# Patient Record
Sex: MALE | Race: WHITE | NOT HISPANIC OR LATINO | URBAN - METROPOLITAN AREA
[De-identification: names, ages, dates, MRNs, and addresses within clinical notes are randomized per-mention and may not be internally consistent; named-entity substitution may affect disease eponyms.]

---
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
## 2024-05-24 ENCOUNTER — HOSPITAL ENCOUNTER (EMERGENCY)
Facility: MEDICAL CENTER | Age: 24
End: 2024-05-24
Attending: EMERGENCY MEDICINE
Payer: COMMERCIAL

## 2024-05-24 ENCOUNTER — APPOINTMENT (OUTPATIENT)
Dept: RADIOLOGY | Facility: MEDICAL CENTER | Age: 24
End: 2024-05-24
Attending: EMERGENCY MEDICINE
Payer: COMMERCIAL

## 2024-05-24 VITALS
WEIGHT: 220 LBS | HEIGHT: 70 IN | RESPIRATION RATE: 16 BRPM | BODY MASS INDEX: 31.5 KG/M2 | DIASTOLIC BLOOD PRESSURE: 87 MMHG | TEMPERATURE: 98.5 F | OXYGEN SATURATION: 97 % | HEART RATE: 75 BPM | SYSTOLIC BLOOD PRESSURE: 147 MMHG

## 2024-05-24 DIAGNOSIS — R68.89 LIGHT SENSITIVITY: ICD-10-CM

## 2024-05-24 DIAGNOSIS — F07.81 POST CONCUSSION SYNDROME: ICD-10-CM

## 2024-05-24 LAB
ALBUMIN SERPL BCP-MCNC: 4.8 G/DL (ref 3.2–4.9)
ALBUMIN/GLOB SERPL: 1.5 G/DL
ALP SERPL-CCNC: 114 U/L (ref 30–99)
ALT SERPL-CCNC: 14 U/L (ref 2–50)
ANION GAP SERPL CALC-SCNC: 14 MMOL/L (ref 7–16)
AST SERPL-CCNC: 22 U/L (ref 12–45)
BASOPHILS # BLD AUTO: 0.3 % (ref 0–1.8)
BASOPHILS # BLD: 0.02 K/UL (ref 0–0.12)
BILIRUB SERPL-MCNC: 0.9 MG/DL (ref 0.1–1.5)
BUN SERPL-MCNC: 17 MG/DL (ref 8–22)
CALCIUM ALBUM COR SERPL-MCNC: 9.1 MG/DL (ref 8.5–10.5)
CALCIUM SERPL-MCNC: 9.7 MG/DL (ref 8.5–10.5)
CHLORIDE SERPL-SCNC: 101 MMOL/L (ref 96–112)
CO2 SERPL-SCNC: 22 MMOL/L (ref 20–33)
CREAT SERPL-MCNC: 0.87 MG/DL (ref 0.5–1.4)
CRP SERPL HS-MCNC: <0.3 MG/DL (ref 0–0.75)
EOSINOPHIL # BLD AUTO: 0.09 K/UL (ref 0–0.51)
EOSINOPHIL NFR BLD: 1.4 % (ref 0–6.9)
ERYTHROCYTE [DISTWIDTH] IN BLOOD BY AUTOMATED COUNT: 37.3 FL (ref 35.9–50)
ERYTHROCYTE [SEDIMENTATION RATE] IN BLOOD BY WESTERGREN METHOD: 2 MM/HOUR (ref 0–20)
GFR SERPLBLD CREATININE-BSD FMLA CKD-EPI: 125 ML/MIN/1.73 M 2
GLOBULIN SER CALC-MCNC: 3.2 G/DL (ref 1.9–3.5)
GLUCOSE SERPL-MCNC: 98 MG/DL (ref 65–99)
HCT VFR BLD AUTO: 54 % (ref 42–52)
HGB BLD-MCNC: 18.8 G/DL (ref 14–18)
IMM GRANULOCYTES # BLD AUTO: 0.01 K/UL (ref 0–0.11)
IMM GRANULOCYTES NFR BLD AUTO: 0.2 % (ref 0–0.9)
LYMPHOCYTES # BLD AUTO: 2.21 K/UL (ref 1–4.8)
LYMPHOCYTES NFR BLD: 33.2 % (ref 22–41)
MCH RBC QN AUTO: 30 PG (ref 27–33)
MCHC RBC AUTO-ENTMCNC: 34.8 G/DL (ref 32.3–36.5)
MCV RBC AUTO: 86.3 FL (ref 81.4–97.8)
MONOCYTES # BLD AUTO: 0.54 K/UL (ref 0–0.85)
MONOCYTES NFR BLD AUTO: 8.1 % (ref 0–13.4)
NEUTROPHILS # BLD AUTO: 3.79 K/UL (ref 1.82–7.42)
NEUTROPHILS NFR BLD: 56.8 % (ref 44–72)
NRBC # BLD AUTO: 0 K/UL
NRBC BLD-RTO: 0 /100 WBC (ref 0–0.2)
PLATELET # BLD AUTO: 245 K/UL (ref 164–446)
PMV BLD AUTO: 9.5 FL (ref 9–12.9)
POTASSIUM SERPL-SCNC: 4.2 MMOL/L (ref 3.6–5.5)
PROT SERPL-MCNC: 8 G/DL (ref 6–8.2)
RBC # BLD AUTO: 6.26 M/UL (ref 4.7–6.1)
SODIUM SERPL-SCNC: 137 MMOL/L (ref 135–145)
WBC # BLD AUTO: 6.7 K/UL (ref 4.8–10.8)

## 2024-05-24 RX ORDER — SODIUM CHLORIDE, SODIUM LACTATE, POTASSIUM CHLORIDE, CALCIUM CHLORIDE 600; 310; 30; 20 MG/100ML; MG/100ML; MG/100ML; MG/100ML
1000 INJECTION, SOLUTION INTRAVENOUS ONCE
Status: COMPLETED | OUTPATIENT
Start: 2024-05-24 | End: 2024-05-24

## 2024-05-24 RX ORDER — KETOROLAC TROMETHAMINE 15 MG/ML
15 INJECTION, SOLUTION INTRAMUSCULAR; INTRAVENOUS ONCE
Status: COMPLETED | OUTPATIENT
Start: 2024-05-24 | End: 2024-05-24

## 2024-05-24 RX ORDER — ONDANSETRON 2 MG/ML
4 INJECTION INTRAMUSCULAR; INTRAVENOUS ONCE
Status: COMPLETED | OUTPATIENT
Start: 2024-05-24 | End: 2024-05-24

## 2024-05-24 RX ADMIN — KETOROLAC TROMETHAMINE 15 MG: 15 INJECTION, SOLUTION INTRAMUSCULAR; INTRAVENOUS at 13:09

## 2024-05-24 RX ADMIN — SODIUM CHLORIDE, POTASSIUM CHLORIDE, SODIUM LACTATE AND CALCIUM CHLORIDE 1000 ML: 600; 310; 30; 20 INJECTION, SOLUTION INTRAVENOUS at 13:10

## 2024-05-24 RX ADMIN — ONDANSETRON 4 MG: 2 INJECTION INTRAMUSCULAR; INTRAVENOUS at 13:09

## 2024-05-24 ASSESSMENT — FIBROSIS 4 INDEX: FIB4 SCORE: 0.5

## 2024-05-24 NOTE — ED NOTES
The patient has been provided with discharge education and information.  The patient was also provided with instructions on follow up care and return precautions.  The patient verbalizes understanding of discharge instructions, follow up care, and return precautions.  All questions have been answered.  No RX prescribed by ERP.  NAD, A/Ox4, good color and appropriate at time of discharge.  Patient wheeled out of department with mother.

## 2024-05-24 NOTE — ED TRIAGE NOTES
"Chief Complaint   Patient presents with    Headache     Pt was at an event with loud music and has relapsing symptoses of his concussion. Reoccurrence approx every 4 months.   Confusion, body heaviness, pupil dilation ,photosensitivity, \"vibrating\"    This occurrence has been going on approx 3 weeks.     BP (!) 145/93   Pulse 75   Temp 36.9 °C (98.5 °F) (Temporal)   Resp 16   Ht 1.778 m (5' 10\")   Wt 99.8 kg (220 lb)   SpO2 98%   BMI 31.57 kg/m²       "

## 2024-05-24 NOTE — DISCHARGE INSTRUCTIONS
Book your appointment  [facility] conducts concussion baseline and post-injury screening for free or for a nominal cost. A full list of costs will be provided upon request for an evaluation.    This testing includes an assessment of balance, vision, and cognition. It takes approximately 60 minutes to complete; however, times may vary based on your individual needs. During your appointment, if you do not have a physician involved in your care, we are happy to provide a list and/or a referral to our numerous collaborating community partners.    Appointments can be requested by a physician, parent, or patient. To schedule an appointment, please click the link below and when prompted, enter the “[email or web address]” for the appointment password.    Once the form is filled out, you will receive a request for an appointment from our staff via email. If you have not received an email within 24 hours (unless it is a weekend), please call [phone]. All patients under the age of 18 must be accompanied by a legal parent or guardian.

## 2024-05-24 NOTE — ED PROVIDER NOTES
"ED Provider Note    CHIEF COMPLAINT  Chief Complaint   Patient presents with    Headache     Pt was at an event with loud music and has relapsing symptoses of his concussion. Reoccurrence approx every 4 months.   Confusion, body heaviness, pupil dilation ,photosensitivity, \"vibrating\"        EXTERNAL RECORDS REVIEWED  Reviewed previous imaging studies    HPI/ROS  LIMITATION TO HISTORY   None  OUTSIDE HISTORIAN(S):  Mother provided additional history    Shaka Fajardo is a 23 y.o. male who presents for evaluation of several months of waxing and waning reported headaches light sensitivity.  Patient is accompanied by his mother.  Apparently had a concussion around 18 months ago.  His workup was negative but he has episodes coming and going of light and sound sensitivity.  There is a personal history of migraine type headache.  He apparently had been seen by an optometrist but not an ophthalmologist yet.  Interestingly, they were prescribed red top dilating drops as the optometrist felt this may help but that was causing significant light sensitivity which would be expected.  They stopped using that around 2 weeks ago.  He does report photosensitivity and \"vibrating sensation.  He has no new or recent history of head injury.  No associated fevers or chills.  No numbness tingling or weakness    PAST MEDICAL HISTORY       SURGICAL HISTORY  patient denies any surgical history    FAMILY HISTORY  History reviewed. No pertinent family history.    SOCIAL HISTORY  Social History     Tobacco Use    Smoking status: Never    Smokeless tobacco: Never   Substance and Sexual Activity    Alcohol use: Never    Drug use: Never    Sexual activity: Not on file       CURRENT MEDICATIONS  Home Medications       Reviewed by Maya Fishman R.N. (Registered Nurse) on 05/24/24 at 1146  Med List Status: Not Addressed     Medication Last Dose Status        Patient Christian Taking any Medications                         Audit from " "Redirected Encounters    **Home medications have not yet been reviewed for this encounter**         ALLERGIES  Allergies   Allergen Reactions    Penicillins Shortness of Breath       PHYSICAL EXAM  VITAL SIGNS: BP (!) 147/87   Pulse 75   Temp 36.9 °C (98.5 °F) (Temporal)   Resp 16   Ht 1.778 m (5' 10\")   Wt 99.8 kg (220 lb)   SpO2 97%   BMI 31.57 kg/m²    Pulse ox interpretation: I interpret this pulse ox as normal.  Constitutional: Alert and oriented x 3, patient appears photophobic moderate distress  HEENT: Atraumatic normocephalic, pupils 3 mm are equal round reactive to light extraocular movements are intact. The nares is clear, external ears are normal, mouth shows moist mucous membranes normal dentition for age  Neck: Supple, no JVD no tracheal deviation no nuchal rigidity  Cardiovascular: Regular rate and rhythm no murmur rub or gallop 2+ pulses peripherally x4  Thorax & Lungs: No respiratory distress, no wheezes rales or rhonchi, No chest tenderness.   GI: Soft nontender nondistended positive bowel sounds, no peritoneal signs  Skin: Warm dry no acute rash or lesion  Musculoskeletal: Moving all extremities with full range and 5 of 5 strength no acute  deformity  Neurologic: Cranial nerves III through XII are grossly intact no sensory deficit no cerebellar dysfunction NIH stroke scale score of 0 no pronator drift of the arms or legs finger-nose testing is normal no ataxia  Psychiatric: Appropriate affect for situation at this time NIH stroke scale          EKG/LABS  Results for orders placed or performed during the hospital encounter of 05/24/24   CBC WITH DIFFERENTIAL   Result Value Ref Range    WBC 6.7 4.8 - 10.8 K/uL    RBC 6.26 (H) 4.70 - 6.10 M/uL    Hemoglobin 18.8 (H) 14.0 - 18.0 g/dL    Hematocrit 54.0 (H) 42.0 - 52.0 %    MCV 86.3 81.4 - 97.8 fL    MCH 30.0 27.0 - 33.0 pg    MCHC 34.8 32.3 - 36.5 g/dL    RDW 37.3 35.9 - 50.0 fL    Platelet Count 245 164 - 446 K/uL    MPV 9.5 9.0 - 12.9 fL    " Neutrophils-Polys 56.80 44.00 - 72.00 %    Lymphocytes 33.20 22.00 - 41.00 %    Monocytes 8.10 0.00 - 13.40 %    Eosinophils 1.40 0.00 - 6.90 %    Basophils 0.30 0.00 - 1.80 %    Immature Granulocytes 0.20 0.00 - 0.90 %    Nucleated RBC 0.00 0.00 - 0.20 /100 WBC    Neutrophils (Absolute) 3.79 1.82 - 7.42 K/uL    Lymphs (Absolute) 2.21 1.00 - 4.80 K/uL    Monos (Absolute) 0.54 0.00 - 0.85 K/uL    Eos (Absolute) 0.09 0.00 - 0.51 K/uL    Baso (Absolute) 0.02 0.00 - 0.12 K/uL    Immature Granulocytes (abs) 0.01 0.00 - 0.11 K/uL    NRBC (Absolute) 0.00 K/uL   Comp Metabolic Panel   Result Value Ref Range    Sodium 137 135 - 145 mmol/L    Potassium 4.2 3.6 - 5.5 mmol/L    Chloride 101 96 - 112 mmol/L    Co2 22 20 - 33 mmol/L    Anion Gap 14.0 7.0 - 16.0    Glucose 98 65 - 99 mg/dL    Bun 17 8 - 22 mg/dL    Creatinine 0.87 0.50 - 1.40 mg/dL    Calcium 9.7 8.5 - 10.5 mg/dL    Correct Calcium 9.1 8.5 - 10.5 mg/dL    AST(SGOT) 22 12 - 45 U/L    ALT(SGPT) 14 2 - 50 U/L    Alkaline Phosphatase 114 (H) 30 - 99 U/L    Total Bilirubin 0.9 0.1 - 1.5 mg/dL    Albumin 4.8 3.2 - 4.9 g/dL    Total Protein 8.0 6.0 - 8.2 g/dL    Globulin 3.2 1.9 - 3.5 g/dL    A-G Ratio 1.5 g/dL   CRP QUANTITIVE (NON-CARDIAC)   Result Value Ref Range    Stat C-Reactive Protein <0.30 0.00 - 0.75 mg/dL   Sed Rate   Result Value Ref Range    Sed Rate Westergren 2 0 - 20 mm/hour   ESTIMATED GFR   Result Value Ref Range    GFR (CKD-EPI) 125 >60 mL/min/1.73 m 2      I have independently interpreted this EKG    RADIOLOGY/PROCEDURES   I have independently interpreted the diagnostic imaging associated with this visit and am waiting the final reading from the radiologist.   My preliminary interpretation is as follows: No acute process    Radiologist interpretation:  CT-HEAD W/O   Final Result      1.  No intracranial abnormality detected.   2.  Acute on chronic right maxillary sinusitis.             COURSE & MEDICAL DECISION MAKING    ASSESSMENT, COURSE AND  PLAN  Care Narrative:     This is a very pleasant 23-year-old gentleman accompanied by his mom for waxing and waning symptoms including headache light and sound sensitivity and fatigue ever since he sustained what was described as a concussion around 18 months ago.  He already had initial brain imaging and referral with Dr. Guzmán with neurology.  Patient continues to have symptoms when he gets rundown.  Here he had a reassuring set of vital signs.  Specifically no high fever hypotension tachycardia or hypoxia.  He has a nonfocal neurological exam.  He has no neck stiffness or neurological deficit.  CBC metabolic panel and inflammatory markers such as sed rate and CRP are all reassuring and normal.  CT scan of the head did not demonstrate any acute process.  He was given IV fluids Toradol and Zofran and had somewhat improved symptoms.  Had a very lengthy discussion with the mother.  They already have an appointment with her neurologist in 3 days which will be next Tuesday.  I will provide new referral to Dr. Schmitt with ophthalmology as a I do feel he will require definitive ophthalmological consultation if symptoms continue.  Obviously the default diagnosis would be rather prolonged postconcussive syndrome for which that is a diagnosis of exclusion.  We will provide new referral to the [facility] neuro mechanics concussion lab as they are the regions definitive concussion experts.    Hydration: Based on the patient's presentation of Acute Vomiting the patient was given IV fluids. IV Hydration was used because oral hydration was not adequate alone. Upon recheck following hydration, the patient was improved.          ADDITIONAL PROBLEMS MANAGED      DISPOSITION AND DISCUSSIONS  I have discussed management of the patient with the following physicians and BOB's: None    Discussion of management with other QHP or appropriate source(s): None    Escalation of care considered, and ultimately not performed:  Considered MRI    Barriers to care at this time, including but not limited to: None.     Decision tools and prescription drugs considered including, but not limited to: NIH stroke scale score was utilized.    FINAL DIAGNOSIS  1. Post concussion syndrome    2. Light sensitivity           Electronically signed by: Nikolay March M.D., 5/24/2024 1:10 PM

## 2024-11-18 ENCOUNTER — APPOINTMENT (RX ONLY)
Dept: URBAN - METROPOLITAN AREA CLINIC 31 | Facility: CLINIC | Age: 24
Setting detail: DERMATOLOGY
End: 2024-11-18

## 2024-11-18 DIAGNOSIS — D18.0 HEMANGIOMA: ICD-10-CM

## 2024-11-18 DIAGNOSIS — Z71.89 OTHER SPECIFIED COUNSELING: ICD-10-CM

## 2024-11-18 DIAGNOSIS — L72.8 OTHER FOLLICULAR CYSTS OF THE SKIN AND SUBCUTANEOUS TISSUE: ICD-10-CM

## 2024-11-18 DIAGNOSIS — L81.4 OTHER MELANIN HYPERPIGMENTATION: ICD-10-CM

## 2024-11-18 DIAGNOSIS — D22 MELANOCYTIC NEVI: ICD-10-CM

## 2024-11-18 PROBLEM — D22.71 MELANOCYTIC NEVI OF RIGHT LOWER LIMB, INCLUDING HIP: Status: ACTIVE | Noted: 2024-11-18

## 2024-11-18 PROBLEM — D22.62 MELANOCYTIC NEVI OF LEFT UPPER LIMB, INCLUDING SHOULDER: Status: ACTIVE | Noted: 2024-11-18

## 2024-11-18 PROBLEM — D22.21 MELANOCYTIC NEVI OF RIGHT EAR AND EXTERNAL AURICULAR CANAL: Status: ACTIVE | Noted: 2024-11-18

## 2024-11-18 PROBLEM — D22.5 MELANOCYTIC NEVI OF TRUNK: Status: ACTIVE | Noted: 2024-11-18

## 2024-11-18 PROBLEM — D22.61 MELANOCYTIC NEVI OF RIGHT UPPER LIMB, INCLUDING SHOULDER: Status: ACTIVE | Noted: 2024-11-18

## 2024-11-18 PROBLEM — D18.01 HEMANGIOMA OF SKIN AND SUBCUTANEOUS TISSUE: Status: ACTIVE | Noted: 2024-11-18

## 2024-11-18 PROBLEM — D22.72 MELANOCYTIC NEVI OF LEFT LOWER LIMB, INCLUDING HIP: Status: ACTIVE | Noted: 2024-11-18

## 2024-11-18 PROCEDURE — ? COUNSELING

## 2024-11-18 PROCEDURE — 99203 OFFICE O/P NEW LOW 30 MIN: CPT

## 2024-11-18 ASSESSMENT — LOCATION SIMPLE DESCRIPTION DERM
LOCATION SIMPLE: RIGHT UPPER BACK
LOCATION SIMPLE: RIGHT UPPER ARM
LOCATION SIMPLE: SCALP
LOCATION SIMPLE: LEFT HAND
LOCATION SIMPLE: LEFT THIGH
LOCATION SIMPLE: LEFT SHOULDER
LOCATION SIMPLE: RIGHT LOWER BACK
LOCATION SIMPLE: RIGHT SHOULDER
LOCATION SIMPLE: LEFT UPPER ARM
LOCATION SIMPLE: RIGHT HAND
LOCATION SIMPLE: RIGHT EAR
LOCATION SIMPLE: RIGHT THIGH
LOCATION SIMPLE: RIGHT FOREARM
LOCATION SIMPLE: LEFT FOREARM
LOCATION SIMPLE: RIGHT CHEEK
LOCATION SIMPLE: ABDOMEN
LOCATION SIMPLE: RIGHT ANTERIOR NECK
LOCATION SIMPLE: LEFT CHEEK

## 2024-11-18 ASSESSMENT — LOCATION DETAILED DESCRIPTION DERM
LOCATION DETAILED: LEFT INFERIOR PARIETAL SCALP
LOCATION DETAILED: RIGHT ULNAR DORSAL HAND
LOCATION DETAILED: LEFT ANTERIOR DISTAL THIGH
LOCATION DETAILED: LEFT ANTERIOR DISTAL UPPER ARM
LOCATION DETAILED: RIGHT PROXIMAL POSTERIOR UPPER ARM
LOCATION DETAILED: LEFT RADIAL DORSAL HAND
LOCATION DETAILED: RIGHT SUPERIOR ANTERIOR NECK
LOCATION DETAILED: LEFT PROXIMAL DORSAL FOREARM
LOCATION DETAILED: RIGHT ANTERIOR DISTAL THIGH
LOCATION DETAILED: LEFT LATERAL MALAR CHEEK
LOCATION DETAILED: RIGHT CENTRAL MALAR CHEEK
LOCATION DETAILED: RIGHT ANTERIOR DISTAL UPPER ARM
LOCATION DETAILED: RIGHT INFERIOR MEDIAL UPPER BACK
LOCATION DETAILED: RIGHT SUPERIOR HELIX
LOCATION DETAILED: LEFT PROXIMAL POSTERIOR UPPER ARM
LOCATION DETAILED: RIGHT SUPERIOR MEDIAL MIDBACK
LOCATION DETAILED: RIGHT PROXIMAL DORSAL FOREARM
LOCATION DETAILED: EPIGASTRIC SKIN
LOCATION DETAILED: RIGHT POSTERIOR SHOULDER
LOCATION DETAILED: LEFT POSTERIOR SHOULDER

## 2024-11-18 ASSESSMENT — LOCATION ZONE DERM
LOCATION ZONE: LEG
LOCATION ZONE: NECK
LOCATION ZONE: ARM
LOCATION ZONE: SCALP
LOCATION ZONE: TRUNK
LOCATION ZONE: EAR
LOCATION ZONE: FACE
LOCATION ZONE: HAND

## 2025-01-04 ENCOUNTER — HOSPITAL ENCOUNTER (OUTPATIENT)
Facility: MEDICAL CENTER | Age: 25
End: 2025-01-05
Attending: EMERGENCY MEDICINE | Admitting: INTERNAL MEDICINE
Payer: COMMERCIAL

## 2025-01-04 ENCOUNTER — APPOINTMENT (OUTPATIENT)
Dept: RADIOLOGY | Facility: MEDICAL CENTER | Age: 25
End: 2025-01-04
Attending: EMERGENCY MEDICINE
Payer: COMMERCIAL

## 2025-01-04 DIAGNOSIS — R55 SYNCOPE AND COLLAPSE: ICD-10-CM

## 2025-01-04 PROBLEM — E87.8 ELECTROLYTE IMBALANCE: Status: ACTIVE | Noted: 2025-01-04

## 2025-01-04 PROBLEM — F07.81 POSTCONCUSSIVE SYNDROME: Status: ACTIVE | Noted: 2025-01-04

## 2025-01-04 PROBLEM — Z87.820 HISTORY OF CONCUSSION: Status: ACTIVE | Noted: 2025-01-04

## 2025-01-04 PROBLEM — J45.20 MILD INTERMITTENT ASTHMA WITHOUT COMPLICATION: Status: ACTIVE | Noted: 2025-01-04

## 2025-01-04 PROBLEM — Z87.820 HISTORY OF CONCUSSION: Status: RESOLVED | Noted: 2025-01-04 | Resolved: 2025-01-04

## 2025-01-04 LAB
ALBUMIN SERPL BCP-MCNC: 4.5 G/DL (ref 3.2–4.9)
ALBUMIN/GLOB SERPL: 1.7 G/DL
ALP SERPL-CCNC: 107 U/L (ref 30–99)
ALT SERPL-CCNC: 13 U/L (ref 2–50)
ANION GAP SERPL CALC-SCNC: 12 MMOL/L (ref 7–16)
APPEARANCE UR: CLEAR
AST SERPL-CCNC: 19 U/L (ref 12–45)
BASOPHILS # BLD AUTO: 0.7 % (ref 0–1.8)
BASOPHILS # BLD: 0.05 K/UL (ref 0–0.12)
BILIRUB SERPL-MCNC: 0.8 MG/DL (ref 0.1–1.5)
BILIRUB UR QL STRIP.AUTO: NEGATIVE
BUN SERPL-MCNC: 13 MG/DL (ref 8–22)
CALCIUM ALBUM COR SERPL-MCNC: 8.6 MG/DL (ref 8.5–10.5)
CALCIUM SERPL-MCNC: 9 MG/DL (ref 8.5–10.5)
CHLORIDE SERPL-SCNC: 104 MMOL/L (ref 96–112)
CO2 SERPL-SCNC: 23 MMOL/L (ref 20–33)
COLOR UR: YELLOW
CREAT SERPL-MCNC: 0.92 MG/DL (ref 0.5–1.4)
EOSINOPHIL # BLD AUTO: 0.04 K/UL (ref 0–0.51)
EOSINOPHIL NFR BLD: 0.5 % (ref 0–6.9)
ERYTHROCYTE [DISTWIDTH] IN BLOOD BY AUTOMATED COUNT: 35.6 FL (ref 35.9–50)
GFR SERPLBLD CREATININE-BSD FMLA CKD-EPI: 120 ML/MIN/1.73 M 2
GLOBULIN SER CALC-MCNC: 2.7 G/DL (ref 1.9–3.5)
GLUCOSE SERPL-MCNC: 95 MG/DL (ref 65–99)
GLUCOSE UR STRIP.AUTO-MCNC: NEGATIVE MG/DL
HCT VFR BLD AUTO: 45.6 % (ref 42–52)
HGB BLD-MCNC: 16.2 G/DL (ref 14–18)
IMM GRANULOCYTES # BLD AUTO: 0.02 K/UL (ref 0–0.11)
IMM GRANULOCYTES NFR BLD AUTO: 0.3 % (ref 0–0.9)
KETONES UR STRIP.AUTO-MCNC: 15 MG/DL
LACTATE SERPL-SCNC: 1 MMOL/L (ref 0.5–2)
LEUKOCYTE ESTERASE UR QL STRIP.AUTO: NEGATIVE
LIPASE SERPL-CCNC: 20 U/L (ref 11–82)
LYMPHOCYTES # BLD AUTO: 1.96 K/UL (ref 1–4.8)
LYMPHOCYTES NFR BLD: 25.8 % (ref 22–41)
MAGNESIUM SERPL-MCNC: 2.4 MG/DL (ref 1.5–2.5)
MCH RBC QN AUTO: 29.8 PG (ref 27–33)
MCHC RBC AUTO-ENTMCNC: 35.5 G/DL (ref 32.3–36.5)
MCV RBC AUTO: 83.8 FL (ref 81.4–97.8)
MICRO URNS: ABNORMAL
MONOCYTES # BLD AUTO: 0.45 K/UL (ref 0–0.85)
MONOCYTES NFR BLD AUTO: 5.9 % (ref 0–13.4)
NEUTROPHILS # BLD AUTO: 5.08 K/UL (ref 1.82–7.42)
NEUTROPHILS NFR BLD: 66.8 % (ref 44–72)
NITRITE UR QL STRIP.AUTO: NEGATIVE
NRBC # BLD AUTO: 0 K/UL
NRBC BLD-RTO: 0 /100 WBC (ref 0–0.2)
PH UR STRIP.AUTO: 6 [PH] (ref 5–8)
PLATELET # BLD AUTO: 258 K/UL (ref 164–446)
PMV BLD AUTO: 9.1 FL (ref 9–12.9)
POTASSIUM SERPL-SCNC: 3.9 MMOL/L (ref 3.6–5.5)
PROT SERPL-MCNC: 7.2 G/DL (ref 6–8.2)
PROT UR QL STRIP: NEGATIVE MG/DL
RBC # BLD AUTO: 5.44 M/UL (ref 4.7–6.1)
RBC UR QL AUTO: NEGATIVE
SODIUM SERPL-SCNC: 139 MMOL/L (ref 135–145)
SP GR UR STRIP.AUTO: 1.02
UROBILINOGEN UR STRIP.AUTO-MCNC: 0.2 EU/DL
WBC # BLD AUTO: 7.6 K/UL (ref 4.8–10.8)

## 2025-01-04 PROCEDURE — 85025 COMPLETE CBC W/AUTO DIFF WBC: CPT

## 2025-01-04 PROCEDURE — 71045 X-RAY EXAM CHEST 1 VIEW: CPT

## 2025-01-04 PROCEDURE — 99222 1ST HOSP IP/OBS MODERATE 55: CPT | Performed by: INTERNAL MEDICINE

## 2025-01-04 PROCEDURE — A9270 NON-COVERED ITEM OR SERVICE: HCPCS | Performed by: NURSE PRACTITIONER

## 2025-01-04 PROCEDURE — 80053 COMPREHEN METABOLIC PANEL: CPT

## 2025-01-04 PROCEDURE — G0378 HOSPITAL OBSERVATION PER HR: HCPCS

## 2025-01-04 PROCEDURE — 700102 HCHG RX REV CODE 250 W/ 637 OVERRIDE(OP): Performed by: NURSE PRACTITIONER

## 2025-01-04 PROCEDURE — 83690 ASSAY OF LIPASE: CPT

## 2025-01-04 PROCEDURE — 36415 COLL VENOUS BLD VENIPUNCTURE: CPT

## 2025-01-04 PROCEDURE — 83735 ASSAY OF MAGNESIUM: CPT

## 2025-01-04 PROCEDURE — 83605 ASSAY OF LACTIC ACID: CPT

## 2025-01-04 PROCEDURE — 81003 URINALYSIS AUTO W/O SCOPE: CPT

## 2025-01-04 PROCEDURE — 99285 EMERGENCY DEPT VISIT HI MDM: CPT

## 2025-01-04 RX ORDER — IBUPROFEN 600 MG/1
600 TABLET, FILM COATED ORAL EVERY 6 HOURS PRN
Status: DISCONTINUED | OUTPATIENT
Start: 2025-01-04 | End: 2025-01-06 | Stop reason: HOSPADM

## 2025-01-04 RX ORDER — ACETAMINOPHEN 500 MG
500 TABLET ORAL EVERY 6 HOURS PRN
COMMUNITY

## 2025-01-04 RX ORDER — FREMANEZUMAB-VFRM 225 MG/1.5ML
225 INJECTION SUBCUTANEOUS
COMMUNITY

## 2025-01-04 RX ORDER — MEMANTINE HYDROCHLORIDE 10 MG/1
10 TABLET ORAL DAILY
COMMUNITY
Start: 2024-10-04

## 2025-01-04 RX ORDER — ALBUTEROL SULFATE 90 UG/1
2 INHALANT RESPIRATORY (INHALATION)
Status: DISCONTINUED | OUTPATIENT
Start: 2025-01-04 | End: 2025-01-05

## 2025-01-04 RX ORDER — ACETAMINOPHEN 325 MG/1
650 TABLET ORAL EVERY 6 HOURS PRN
Status: DISCONTINUED | OUTPATIENT
Start: 2025-01-04 | End: 2025-01-06 | Stop reason: HOSPADM

## 2025-01-04 RX ORDER — GUANFACINE 1 MG/1
1 TABLET ORAL DAILY
COMMUNITY

## 2025-01-04 RX ORDER — IBUPROFEN 200 MG
600 TABLET ORAL EVERY 6 HOURS PRN
COMMUNITY

## 2025-01-04 RX ORDER — SULFAMETHOXAZOLE AND TRIMETHOPRIM 800; 160 MG/1; MG/1
1 TABLET ORAL 2 TIMES DAILY
Status: ON HOLD | COMMUNITY
End: 2025-01-05

## 2025-01-04 SDOH — ECONOMIC STABILITY: TRANSPORTATION INSECURITY
IN THE PAST 12 MONTHS, HAS THE LACK OF TRANSPORTATION KEPT YOU FROM MEDICAL APPOINTMENTS OR FROM GETTING MEDICATIONS?: NO

## 2025-01-04 SDOH — ECONOMIC STABILITY: TRANSPORTATION INSECURITY
IN THE PAST 12 MONTHS, HAS LACK OF RELIABLE TRANSPORTATION KEPT YOU FROM MEDICAL APPOINTMENTS, MEETINGS, WORK OR FROM GETTING THINGS NEEDED FOR DAILY LIVING?: NO

## 2025-01-04 ASSESSMENT — LIFESTYLE VARIABLES
ON A TYPICAL DAY WHEN YOU DRINK ALCOHOL HOW MANY DRINKS DO YOU HAVE: 0
EVER HAD A DRINK FIRST THING IN THE MORNING TO STEADY YOUR NERVES TO GET RID OF A HANGOVER: NO
TOTAL SCORE: 0
HOW MANY TIMES IN THE PAST YEAR HAVE YOU HAD 5 OR MORE DRINKS IN A DAY: 0
TOTAL SCORE: 0
SUBSTANCE_ABUSE: 0
HAVE PEOPLE ANNOYED YOU BY CRITICIZING YOUR DRINKING: NO
HAVE YOU EVER FELT YOU SHOULD CUT DOWN ON YOUR DRINKING: NO
TOTAL SCORE: 0
ALCOHOL_USE: NO
CONSUMPTION TOTAL: NEGATIVE
EVER FELT BAD OR GUILTY ABOUT YOUR DRINKING: NO
AVERAGE NUMBER OF DAYS PER WEEK YOU HAVE A DRINK CONTAINING ALCOHOL: 0

## 2025-01-04 ASSESSMENT — SOCIAL DETERMINANTS OF HEALTH (SDOH)
WITHIN THE PAST 12 MONTHS, YOU WORRIED THAT YOUR FOOD WOULD RUN OUT BEFORE YOU GOT THE MONEY TO BUY MORE: NEVER TRUE
WITHIN THE LAST YEAR, HAVE TO BEEN RAPED OR FORCED TO HAVE ANY KIND OF SEXUAL ACTIVITY BY YOUR PARTNER OR EX-PARTNER?: NO
WITHIN THE PAST 12 MONTHS, THE FOOD YOU BOUGHT JUST DIDN'T LAST AND YOU DIDN'T HAVE MONEY TO GET MORE: NEVER TRUE
WITHIN THE LAST YEAR, HAVE YOU BEEN AFRAID OF YOUR PARTNER OR EX-PARTNER?: NO
WITHIN THE LAST YEAR, HAVE YOU BEEN HUMILIATED OR EMOTIONALLY ABUSED IN OTHER WAYS BY YOUR PARTNER OR EX-PARTNER?: NO
WITHIN THE LAST YEAR, HAVE YOU BEEN KICKED, HIT, SLAPPED, OR OTHERWISE PHYSICALLY HURT BY YOUR PARTNER OR EX-PARTNER?: NO
IN THE PAST 12 MONTHS, HAS THE ELECTRIC, GAS, OIL, OR WATER COMPANY THREATENED TO SHUT OFF SERVICE IN YOUR HOME?: NO

## 2025-01-04 ASSESSMENT — COGNITIVE AND FUNCTIONAL STATUS - GENERAL
SUGGESTED CMS G CODE MODIFIER MOBILITY: CH
SUGGESTED CMS G CODE MODIFIER DAILY ACTIVITY: CH
DAILY ACTIVITIY SCORE: 24
MOBILITY SCORE: 24

## 2025-01-04 ASSESSMENT — ENCOUNTER SYMPTOMS
SINUS PAIN: 0
SHORTNESS OF BREATH: 0
WHEEZING: 0
CONSTIPATION: 0
FEVER: 0
DIAPHORESIS: 0
VOMITING: 0
FLANK PAIN: 0
MYALGIAS: 0
NAUSEA: 0
NERVOUS/ANXIOUS: 0
HEADACHES: 0
CHILLS: 0
SORE THROAT: 0
WEIGHT LOSS: 0
ABDOMINAL PAIN: 0
DIZZINESS: 0
DIARRHEA: 0
COUGH: 0
SEIZURES: 0
DEPRESSION: 0

## 2025-01-04 ASSESSMENT — FIBROSIS 4 INDEX
FIB4 SCORE: 0.45
FIB4 SCORE: 0.49

## 2025-01-04 ASSESSMENT — PAIN DESCRIPTION - PAIN TYPE
TYPE: ACUTE PAIN
TYPE: ACUTE PAIN

## 2025-01-04 ASSESSMENT — PATIENT HEALTH QUESTIONNAIRE - PHQ9
1. LITTLE INTEREST OR PLEASURE IN DOING THINGS: NOT AT ALL
2. FEELING DOWN, DEPRESSED, IRRITABLE, OR HOPELESS: NOT AT ALL
SUM OF ALL RESPONSES TO PHQ9 QUESTIONS 1 AND 2: 0

## 2025-01-04 NOTE — ED PROVIDER NOTES
ED Provider Note    CHIEF COMPLAINT  Chief Complaint   Patient presents with    Seizure     EMS transfer from [facility] for seizure <2 minutes witnessed by mom. Sent for EEG and neurology consult    Abnormal Labs     K 2.9, Ca 6.4, Mg 1.3       HPI  Shaka Fajardo is a 24 y.o. male who presents in transfer from [facility] out of concern for possible seizure event today.  Patient was on an exercycle when he started feeling very dizzy and lightheaded and ended up passing out on the floor and possibly having a seizure event as witnessed by his mother.  From the patient's mother's description, sounds like a tonic event but the patient did not lose bowel or bladder control, and did not bite his tongue.  He has no history of seizures but he has had multiple neurology workups for atypical migraines after a head injury 2 years ago.  He is on several different medications to help control the migraines.  EXTERNAL RECORDS REVIEWED  Reviewed intermittent neurology notes from the past 2 years.  Reviewed visit to [facility] emergency department today.  ROS  Constitutional: No fevers or chills  Skin: No rashes  HEENT: No sore throat, or runny nose  Neck: No neck pain  Chest: No pain or rashes  Pulm: No shortness of breath, cough, wheezing, stridor, or pain with inspiration/expiration  Gastrointestinal: No nausea, vomiting, diarrhea, or abdominal pain.  Genitourinary: No dysuria or hematuria  Musculoskeletal: No pain, swelling, or focal weakness  Neurologic: No current sensory or focal motor changes to extremities. No confusion or disorientation.  Heme: No bleeding or bruising problems.   Immuno: No hx of recurrent infections        LIMITATION TO HISTORY   None  OUTSIDE HISTORIAN(S):  None        PAST FAM HISTORY  History reviewed. No pertinent family history.    PAST MEDICAL HISTORY   has a past medical history of Asthma.    SOCIAL HISTORY  Social History     Tobacco Use    Smoking status: Never     "Smokeless tobacco: Never   Vaping Use    Vaping status: Never Used   Substance and Sexual Activity    Alcohol use: Never    Drug use: Never    Sexual activity: Not on file       SURGICAL HISTORY   has a past surgical history that includes other (12/10/2024).    CURRENT MEDICATIONS  Home Medications       Reviewed by Ciera Mcarthur R.N. (Registered Nurse) on 01/04/25 at 1612  Med List Status: Complete     Medication Last Dose Status   acetaminophen (TYLENOL) 500 MG Tab 12/20/2024 Active   Cyanocobalamin (VITAMIN B 12 PO) 11/20/2024 Active   Fremanezumab-vfrm (AJOVY) 225 MG/1.5ML Solution Prefilled Syringe 1/1/2025 Active   guanFACINE (TENEX) 1 MG Tab 1/3/2025 Active   ibuprofen (MOTRIN) 200 MG Tab 12/20/2024 Active   MAGNESIUM GLYCINATE PO 1/3/2025 Active   memantine (NAMENDA) 10 MG Tab 1/3/2025 Active   sulfamethoxazole-trimethoprim (BACTRIM DS) 800-160 MG tablet 12/29/2024 Active                  Audit from Redirected Encounters    **Home medications have not yet been reviewed for this encounter**          ALLERGIES  Allergies   Allergen Reactions    Penicillins Shortness of Breath    Keflex [Cephalexin] Cough       PHYSICAL EXAM  VITAL SIGNS: /79   Pulse 78   Temp 36.8 °C (98.2 °F) (Temporal)   Resp 16   Ht 1.778 m (5' 10\")   Wt 94.6 kg (208 lb 8.9 oz)   SpO2 96%   BMI 29.92 kg/m²    Gen: Alert in no apparent distress.  Smiling, conversant  HEENT: No signs of trauma, Bilateral external ears normal, Nose normal. Conjunctiva normal, Non-icteric.   Cardiovascular: Regular rate and rhythm, no murmurs.  Capillary refill less than 3 seconds to all extremities, 2+ distal pulses.  Thorax & Lungs: Normal breath sounds, No respiratory distress, No wheezing bilateral chest rise  Abdomen: Bowel sounds normal, Soft, No tenderness, No masses, No pulsatile masses. No Guarding or rebound  Skin: Warm, Dry, No erythema, No rash noted to exposed areas.   Extremities: Intact distal pulses, No edema  Neurologic: Alert , " "no facial droop, grossly normal coordination and strength  Psychiatric: Affect pleasant    INITIAL IMPRESSION  Patient arrives for evaluation and transfer from [facility] for possible neurology consultation and \"continuous EEG.\"  This was [place] neurologist contacted by phone from [facility] emergency department.  Based on the patient's symptoms and history, I feel is very unlikely to be related to a seizure episode and do not feel continuous EEG is going to be necessary.  Will plan on contacting the neurologist prior to admission.  I feel admission is necessary for evaluation of a possible cardiac cause of syncope.  Patient is noted to be on at least 2 different migraine medications, and has had neurology consultations both in [place], and at [place].  He has apparently had an EEG done in the past although it was not a continuous EEG.  He has had CT and MRIs.  Will plan on repeating the patient's blood work to ensure that the repletion of electrolytes is adequate, and contacting the hospitalist.    ED observation? No    LABS  Results for orders placed or performed during the hospital encounter of 01/04/25   CBC WITH DIFFERENTIAL    Collection Time: 01/04/25  2:05 PM   Result Value Ref Range    WBC 7.6 4.8 - 10.8 K/uL    RBC 5.44 4.70 - 6.10 M/uL    Hemoglobin 16.2 14.0 - 18.0 g/dL    Hematocrit 45.6 42.0 - 52.0 %    MCV 83.8 81.4 - 97.8 fL    MCH 29.8 27.0 - 33.0 pg    MCHC 35.5 32.3 - 36.5 g/dL    RDW 35.6 (L) 35.9 - 50.0 fL    Platelet Count 258 164 - 446 K/uL    MPV 9.1 9.0 - 12.9 fL    Neutrophils-Polys 66.80 44.00 - 72.00 %    Lymphocytes 25.80 22.00 - 41.00 %    Monocytes 5.90 0.00 - 13.40 %    Eosinophils 0.50 0.00 - 6.90 %    Basophils 0.70 0.00 - 1.80 %    Immature Granulocytes 0.30 0.00 - 0.90 %    Nucleated RBC 0.00 0.00 - 0.20 /100 WBC    Neutrophils (Absolute) 5.08 1.82 - 7.42 K/uL    Lymphs (Absolute) 1.96 1.00 - 4.80 K/uL    Monos (Absolute) 0.45 0.00 - " 0.85 K/uL    Eos (Absolute) 0.04 0.00 - 0.51 K/uL    Baso (Absolute) 0.05 0.00 - 0.12 K/uL    Immature Granulocytes (abs) 0.02 0.00 - 0.11 K/uL    NRBC (Absolute) 0.00 K/uL   COMP METABOLIC PANEL    Collection Time: 01/04/25  2:05 PM   Result Value Ref Range    Sodium 139 135 - 145 mmol/L    Potassium 3.9 3.6 - 5.5 mmol/L    Chloride 104 96 - 112 mmol/L    Co2 23 20 - 33 mmol/L    Anion Gap 12.0 7.0 - 16.0    Glucose 95 65 - 99 mg/dL    Bun 13 8 - 22 mg/dL    Creatinine 0.92 0.50 - 1.40 mg/dL    Calcium 9.0 8.5 - 10.5 mg/dL    Correct Calcium 8.6 8.5 - 10.5 mg/dL    AST(SGOT) 19 12 - 45 U/L    ALT(SGPT) 13 2 - 50 U/L    Alkaline Phosphatase 107 (H) 30 - 99 U/L    Total Bilirubin 0.8 0.1 - 1.5 mg/dL    Albumin 4.5 3.2 - 4.9 g/dL    Total Protein 7.2 6.0 - 8.2 g/dL    Globulin 2.7 1.9 - 3.5 g/dL    A-G Ratio 1.7 g/dL   LACTIC ACID    Collection Time: 01/04/25  2:05 PM   Result Value Ref Range    Lactic Acid 1.0 0.5 - 2.0 mmol/L   LIPASE    Collection Time: 01/04/25  2:05 PM   Result Value Ref Range    Lipase 20 11 - 82 U/L   MAGNESIUM    Collection Time: 01/04/25  2:05 PM   Result Value Ref Range    Magnesium 2.4 1.5 - 2.5 mg/dL   ESTIMATED GFR    Collection Time: 01/04/25  2:05 PM   Result Value Ref Range    GFR (CKD-EPI) 120 >60 mL/min/1.73 m 2   URINALYSIS (UA)    Collection Time: 01/04/25  2:40 PM    Specimen: Urine   Result Value Ref Range    Color Yellow     Character Clear     Specific Gravity 1.019 <1.035    Ph 6.0 5.0 - 8.0    Glucose Negative Negative mg/dL    Ketones 15 (A) Negative mg/dL    Protein Negative Negative mg/dL    Bilirubin Negative Negative    Urobilinogen, Urine 0.2 <=1.0 EU/dL    Nitrite Negative Negative    Leukocyte Esterase Negative Negative    Occult Blood Negative Negative    Micro Urine Req see below      I have independently interpreted this EKG  Reviewed EKG from Gurmeet Leiva  I have independently interpreted the diagnostic imaging associated with this visit and am waiting the final  reading from the radiologist.   My preliminary interpretation is a follows: 3 view chest x-ray: There are no convincing pulmonary opacities to suggest infiltrates or effusions.  Cardiomediastinal silhouette appears to be within normal limits  RADIOLOGY  DX-CHEST-PORTABLE (1 VIEW)   Final Result         No acute cardiac or pulmonary abnormality is identified.          ASSESSMENT, COURSE AND PLAN  Care Narrative: Case was discussed with the general neurologist on-call.  He agreed that the symptoms are far more likely to be related to a syncopal event than to a seizure event.  He did not recommend continuous EEG and did not feel emergent neurology consultation was necessary.  Case was discussed with the hospitalist who will evaluate the patient in the emergency department for observation and evaluation for cardiac sources of syncope.            I have discussed management of the patient with the following physicians and BOB's: Transferring physician at [facility], neurologist, hospitalist    Escalation of care considered, and ultimately not performed: Imaging, repeat EKG    Barriers to care at this time, including but not limited to: None     Decision tools and Rx drugs considered including, but not limited to : none.     Discussion of management with other QHP or appropriate source(s): None        FINAL IMPRESSION  1. Syncope and collapse        Electronically signed by: Win Sweeney M.D., 1/4/2025 2:08 PM

## 2025-01-04 NOTE — PROGRESS NOTES
Discussed with ER physician. Transfer from [facility] for continuous EEG monitoring. History is consistent with syncope; therefore hospital admission for syncope work-up is pending as per ERP. No event recurrence. Is at baseline and with a normal neurologic examination per report. I agree with ERP that continuous EEG monitoring is not indicated at this time. Please consult neurology if patient has recurrence of syncope or develops seizure-like events. Please reach out with any questions.

## 2025-01-04 NOTE — ED NOTES
Medication history reviewed with PT at bedside    Med rec is complete per PT reporting    Allergies reviewed.     PT was on Bactrim DS 10 day course started 12/19/2024. Last dose was 12/29/2024. Course was completed.    Patient is not taking anticoagulants.    Preferred pharmacy for this visit - [facility] on [address] ([phone])

## 2025-01-04 NOTE — H&P
Hospital Medicine History & Physical Note    Date of Service  1/4/2025    Primary Care Physician  Pcp Pt States None      Code Status  Full Code    Chief Complaint  Chief Complaint   Patient presents with    Seizure     EMS transfer from [facility] for seizure <2 minutes witnessed by mom. Sent for EEG and neurology consult    Abnormal Labs     K 2.9, Ca 6.4, Mg 1.3       History of Presenting Illness  Shaka Fajardo is a very pleasant 24 y.o. male with past medical history of asthma and postconcussive syndrome who transferred from [facility] to [facility] ED presented 1/4/2025 due to a syncopal event. Patient says he just started a workout program today for the new year. Today was the first day he used exercise bike. He says the workout was intense and after a couple minutes  he suddenly became lightheaded and short of breath. He got off the bike and sat down, feeling as though he was going to pass out. He says his heart rate was quite elevated and he could feel it slowing down and suddenly he woke up laying on the ground. He improved for short time, and they had another episode of syncope. Patient's mother was concerned that patient may have had a seizure. However, this is unclear. The patient presented to [facility] it was noted that his electrolytes were significantly low, including magnesium 1.3, potassium 2.9, calcium 6.4. His electrolytes were replaced prior to transport.  Labs obtained at presentation to [facility], Including CBC, CMP, lipase, lactic acid are unremarkable.  The patient denies recent cold or flulike symptoms, denies change in diet, G.I. or  symptoms. He says he felt healthy prior to this event. He feels back to baseline at this time is not had any further events    The patient denies having any episodes like this in the past. He did suffer concussion a couple years ago and has since had issues with focus, memory and, per family report, the patient has not been himself since  that time. He is had neurological workup, including workup at [facility]; however, he is not had any concussion rehab.  The patient does not smoke cigarettes, drink alcohol or use illicit drugs.    I discussed the plan of care with patient, family, and ERP .    Review of Systems  Review of Systems   Constitutional:  Negative for chills, diaphoresis, fever, malaise/fatigue and weight loss.   HENT:  Negative for congestion, sinus pain and sore throat.    Respiratory:  Negative for cough, shortness of breath and wheezing.    Gastrointestinal:  Negative for abdominal pain, constipation, diarrhea, nausea and vomiting.   Genitourinary:  Negative for dysuria, flank pain, frequency and urgency.   Musculoskeletal:  Negative for myalgias.   Skin:  Negative for itching and rash.   Neurological:  Negative for dizziness, seizures and headaches.   Psychiatric/Behavioral:  Negative for depression and substance abuse. The patient is not nervous/anxious.        Past Medical History   has a past medical history of Asthma. Concussion    Surgical History   has a past surgical history that includes other (12/10/2024).     Family History  family history is not on file.   Family history reviewed with patient. There is no family history that is pertinent to the chief complaint.     Social History   reports that he has never smoked. He has never used smokeless tobacco. He reports that he does not drink alcohol and does not use drugs.    Allergies  Allergies   Allergen Reactions    Penicillins Shortness of Breath    Keflex [Cephalexin] Cough       Medications  Prior to Admission Medications   Prescriptions Last Dose Informant Patient Reported? Taking?   guanFACINE (TENEX) 1 MG Tab 1/3/2025 Evening  Yes Yes   Sig: Take 1 mg by mouth every day.   memantine (NAMENDA) 10 MG Tab   Yes Yes   Sig: Take 10 mg by mouth every day.      Facility-Administered Medications: None       Physical Exam  Temp:  [36.7 °C (98 °F)-37.1 °C (98.7 °F)] 37.1 °C (98.7  °F)  Pulse:  [78-95] 88  Resp:  [13-20] 16  BP: (122-140)/(70-94) 136/78  SpO2:  [94 %-97 %] 94 %  Blood Pressure: (!) 130/94   Temperature: 36.7 °C (98 °F)   Pulse: 85   Respiration: 16   Pulse Oximetry: 96 %       Physical Exam  Vitals and nursing note reviewed.   Constitutional:       General: He is not in acute distress.     Appearance: Normal appearance. He is not ill-appearing, toxic-appearing or diaphoretic.   HENT:      Head: Normocephalic and atraumatic.      Nose: Nose normal. No congestion or rhinorrhea.      Mouth/Throat:      Mouth: Mucous membranes are dry.      Pharynx: Oropharynx is clear.   Eyes:      Extraocular Movements: Extraocular movements intact.      Pupils: Pupils are equal, round, and reactive to light.   Cardiovascular:      Rate and Rhythm: Normal rate and regular rhythm.      Pulses: Normal pulses.      Heart sounds: Normal heart sounds. No murmur heard.  Pulmonary:      Effort: Pulmonary effort is normal. No respiratory distress.   Abdominal:      General: Bowel sounds are normal. There is no distension.      Palpations: Abdomen is soft.      Tenderness: There is no abdominal tenderness.   Musculoskeletal:         General: Normal range of motion.      Cervical back: Normal range of motion.      Right lower leg: No edema.      Left lower leg: No edema.   Skin:     General: Skin is warm and dry.      Capillary Refill: Capillary refill takes less than 2 seconds.      Findings: No lesion or rash.   Neurological:      General: No focal deficit present.      Mental Status: He is alert and oriented to person, place, and time.      Cranial Nerves: No cranial nerve deficit.   Psychiatric:         Mood and Affect: Mood normal.         Behavior: Behavior normal.         Laboratory:  Recent Labs     01/04/25  1013 01/04/25  1405   WBC  --  7.6   RBC 5.49 5.44   HEMOGLOBIN 16.1 16.2   HEMATOCRIT 47.5 45.6   MCV 86.6 83.8   MCH 29.4 29.8   MCHC 33.9 35.5   RDW 12.5 35.6*   PLATELETCT 262 258   MPV  7.2 9.1     Recent Labs     25  1405   SODIUM 139   POTASSIUM 3.9   CHLORIDE 104   CO2 23   GLUCOSE 95   BUN 13   CREATININE 0.92   CALCIUM 9.0     Recent Labs     25  1405   ALTSGPT 13   ASTSGOT 19   ALKPHOSPHAT 107*   TBILIRUBIN 0.8   LIPASE 20   GLUCOSE 95       Imaging:  DX-CHEST-PORTABLE (1 VIEW)   Final Result         No acute cardiac or pulmonary abnormality is identified.      EC-ECHOCARDIOGRAM COMPLETE W/ CONT    (Results Pending)           ASSESSMENT/PLAN:  * Syncope and collapse- (present on admission)  Assessment & Plan  Concern concerning for syncope and collapse at the initiation of working out patient has had family members. Patient has had family members who  very early of MI.No stress test, as patient does not have chest pain, and shortness of breath may have been related to onset of exercise  Patient did report possible arrhythmia just prior to passing out. If episode recurs, may consider reaching out to cardiology for Holter monitor  Initially presented with concern for seizure. However, symptoms are consistent with syncope. This was discussed with neurology between [facility] and neurology.  - telemetry   - echocardiogram  - orthostatic blood pressures  - TSH, Vit B12  - consider brain MRI      Postconcussive syndrome- (present on admission)  Assessment & Plan  History of concussion with ongoing symptoms of forgetfulness, fogginess. Patient's mother reports patient has not been the same since. Has had workup for this, but no postconcussive therapy.   Patient should follow-up with his neurologist in [place] for further workup, as well as postconcussive therapy. Patient's father reported neurology at [place] suggested follow-up, which she will share happens  - suggest pursuing rehab for this, including possible alternative therapy     Electrolyte imbalance- (present on admission)  Assessment & Plan  Noted to have magnesium 1.3, potassium 2.9, calcium 6.4 , provided electrolyte  replacement prior to transport. Labs unremarkable at presentation  Patient denies change in diet.  - Repeat labs in the morning    Mild intermittent asthma without complication- (present on admission)  Assessment & Plan  Stable  -albuterol PRN      Justification for Admission Status  I anticipate this patient is appropriate for observation status at this time because patient requires further workup for syncope and collapse, including echocardiogram and telemetry monitoring    Patient will need a Telemetry bed on MEDICAL service  The need is secondary to monitoring for arrhythmia as contributing to episode of syncope.    VTE prophylaxis: SCDs/TEDs

## 2025-01-04 NOTE — ED NOTES
Phone report to T215 MARIAH Ballesteros. Patient to be transported to T215 on [employer] monitor with chart and all belongings. Mother accompanying patient.

## 2025-01-04 NOTE — ED TRIAGE NOTES
Shaka Fajardo  24 y.o. male  Chief Complaint   Patient presents with    Seizure     EMS transfer from [facility] for seizure <2 minutes witnessed by mom. Sent for EEG and neurology consult    Abnormal Labs     K 2.9, Ca 6.4, Mg 1.3     Pt BIB EMS for above complaint.    Pt is GCS 15, speaking in full sentences, follows commands and responds appropriately to questions. Resp are even and unlabored. Patient denies pain.       Vitals:    01/04/25 1328   BP: 138/88   Pulse: 80   Resp: 18   Temp: 36.7 °C (98 °F)   SpO2: 96%

## 2025-01-05 ENCOUNTER — APPOINTMENT (OUTPATIENT)
Dept: CARDIOLOGY | Facility: MEDICAL CENTER | Age: 25
End: 2025-01-05
Attending: NURSE PRACTITIONER
Payer: COMMERCIAL

## 2025-01-05 ENCOUNTER — APPOINTMENT (OUTPATIENT)
Dept: RADIOLOGY | Facility: MEDICAL CENTER | Age: 25
End: 2025-01-05
Attending: NURSE PRACTITIONER
Payer: COMMERCIAL

## 2025-01-05 VITALS
RESPIRATION RATE: 18 BRPM | DIASTOLIC BLOOD PRESSURE: 80 MMHG | SYSTOLIC BLOOD PRESSURE: 134 MMHG | HEIGHT: 70 IN | BODY MASS INDEX: 29.86 KG/M2 | HEART RATE: 76 BPM | OXYGEN SATURATION: 95 % | WEIGHT: 208.56 LBS | TEMPERATURE: 98.1 F

## 2025-01-05 PROBLEM — E87.8 ELECTROLYTE IMBALANCE: Status: RESOLVED | Noted: 2025-01-04 | Resolved: 2025-01-05

## 2025-01-05 PROBLEM — R55 SYNCOPE AND COLLAPSE: Status: RESOLVED | Noted: 2025-01-04 | Resolved: 2025-01-05

## 2025-01-05 LAB
25(OH)D3 SERPL-MCNC: 15 NG/ML (ref 30–100)
LV EJECT FRACT  99904: 63
TSH SERPL-ACNC: 2.75 UIU/ML (ref 0.35–5.5)
VIT B12 SERPL-MCNC: 600 PG/ML (ref 211–911)

## 2025-01-05 PROCEDURE — 84443 ASSAY THYROID STIM HORMONE: CPT

## 2025-01-05 PROCEDURE — G0378 HOSPITAL OBSERVATION PER HR: HCPCS

## 2025-01-05 PROCEDURE — 99239 HOSP IP/OBS DSCHRG MGMT >30: CPT | Performed by: INTERNAL MEDICINE

## 2025-01-05 PROCEDURE — A9270 NON-COVERED ITEM OR SERVICE: HCPCS | Performed by: INTERNAL MEDICINE

## 2025-01-05 PROCEDURE — 700117 HCHG RX CONTRAST REV CODE 255: Mod: JZ | Performed by: NURSE PRACTITIONER

## 2025-01-05 PROCEDURE — 82607 VITAMIN B-12: CPT

## 2025-01-05 PROCEDURE — A9579 GAD-BASE MR CONTRAST NOS,1ML: HCPCS | Mod: JZ | Performed by: NURSE PRACTITIONER

## 2025-01-05 PROCEDURE — 95819 EEG AWAKE AND ASLEEP: CPT | Mod: 26 | Performed by: PSYCHIATRY & NEUROLOGY

## 2025-01-05 PROCEDURE — 70553 MRI BRAIN STEM W/O & W/DYE: CPT

## 2025-01-05 PROCEDURE — 82306 VITAMIN D 25 HYDROXY: CPT

## 2025-01-05 PROCEDURE — 700102 HCHG RX REV CODE 250 W/ 637 OVERRIDE(OP): Performed by: INTERNAL MEDICINE

## 2025-01-05 PROCEDURE — 95819 EEG AWAKE AND ASLEEP: CPT | Performed by: PSYCHIATRY & NEUROLOGY

## 2025-01-05 PROCEDURE — 93306 TTE W/DOPPLER COMPLETE: CPT | Mod: 26 | Performed by: INTERNAL MEDICINE

## 2025-01-05 PROCEDURE — 93306 TTE W/DOPPLER COMPLETE: CPT

## 2025-01-05 RX ORDER — ALBUTEROL SULFATE 90 UG/1
2 INHALANT RESPIRATORY (INHALATION)
Status: DISCONTINUED | OUTPATIENT
Start: 2025-01-05 | End: 2025-01-06 | Stop reason: HOSPADM

## 2025-01-05 RX ORDER — VITAMIN B COMPLEX
1000 TABLET ORAL DAILY
Status: DISCONTINUED | OUTPATIENT
Start: 2025-01-05 | End: 2025-01-06 | Stop reason: HOSPADM

## 2025-01-05 RX ADMIN — GADOTERIDOL 19 ML: 279.3 INJECTION, SOLUTION INTRAVENOUS at 21:28

## 2025-01-05 RX ADMIN — Medication 1000 UNITS: at 15:44

## 2025-01-05 ASSESSMENT — PAIN DESCRIPTION - PAIN TYPE: TYPE: ACUTE PAIN

## 2025-01-05 NOTE — DISCHARGE SUMMARY
Discharge Summary    CHIEF COMPLAINT ON ADMISSION  Chief Complaint   Patient presents with    Seizure     EMS transfer from [facility] for seizure <2 minutes witnessed by mom. Sent for EEG and neurology consult    Abnormal Labs     K 2.9, Ca 6.4, Mg 1.3       Reason for Admission  EMS     Admission Date  1/4/2025    CODE STATUS  Full Code    HPI & HOSPITAL COURSE    Mr. Shaka Fajardo is a very pleasant 24 y.o. male with past medical history of asthma and postconcussive syndrome who transferred from [facility] to [facility] ED presented 1/4/2025 due to a syncopal event.     Patient says he just started a workout program today for the new year. Today was the first day he used exercise bike. He says the workout was intense and after a couple minutes  he suddenly became lightheaded and short of breath. He got off the bike and sat down, feeling as though he was going to pass out. He says his heart rate was quite elevated and he could feel it slowing down and suddenly he woke up laying on the ground. He improved for short time, and they had another episode of syncope. Patient's mother was concerned that patient may have had a seizure. However, this is unclear. The patient presented to [facility] it was noted that his electrolytes were significantly low, including magnesium 1.3, potassium 2.9, calcium 6.4. His electrolytes were replaced prior to transport.  Labs obtained at presentation to [facility], Including CBC, CMP, lipase, lactic acid are unremarkable. The patient denies recent cold or flulike symptoms, denies change in diet, G.I. or  symptoms. He says he felt healthy prior to this event. He feels back to baseline at this time is not had any further events     The patient denies having any episodes like this in the past. He did suffer concussion a couple years ago and has since had issues with focus, memory and, per family report, the patient has not been himself since that time. He is had  neurological workup, including workup at [place]; however, he is not had any concussion rehab.The patient does not smoke cigarettes, drink alcohol or use illicit drugs.  Patient admitted to hospital medicine for management of care.    During this hospitalization, an echocardiogram was obtained which noted normal left ventricular systolic function with LVEF approximately 63%, normal RV size and systolic function with patent foramen ovale by agitated saline injection.  A spot EEG was obtained which also noted no seizures, presence of rare, poorly formed, right temporal, sharply contoured waves volumes is of unclear clinical significance but might point towards increase propensity towards focal seizures arising from that specific region.  An MRI of the brain was also obtained which noted MRI brain with and without contrast within normal limits with hypoplastic right maxillary sinus with mucosal inflammatory changes in the right maxillary and ethmoid sinus and right nasofrontal recess with no evidence of mesial temporal sclerosis, cortical dysplasia, or heterotropic gray matter.    Patient seen and examined prior to being discharged.  Discussed results with patient along with mom who was at bedside.  At this time, no acute pathologies noted.  Per neurology, history is consistent with syncope therefore workup was done as patient is back to his baseline with normal neurologic examination per report.  Patient to continue following with [place] neurology for management of care and continue all home medications.  All questions and concerns answered by doing discharge.  Patient discharged home.    Therefore, he is discharged in good and stable condition to home with close outpatient follow-up.    The patient recovered much more quickly than anticipated on admission.    Discharge Date  01/05/25      FOLLOW UP ITEMS POST DISCHARGE  Please call [phone] to schedule PCP appointment for patient.    Required specialty  appointments include:       Discharge Instructions per CODIE Vasquez    Follow-up with PCP s/p hospitalization  Continue following up with [place] neurology for management of postconcussion syndrome  Continue all home medications    DIET: As tolerated    ACTIVITY: As tolerated    DIAGNOSIS: Syncope    Return to ER if symptoms persist, chest pain, palpitations, shortness of breath, numbness, tingling, weakness, and high fevers.      DISCHARGE DIAGNOSES  Principal Problem (Resolved):    Syncope and collapse (POA: Yes)  Active Problems:    Postconcussive syndrome (POA: Yes)    Mild intermittent asthma without complication (POA: Yes)  Resolved Problems:    Electrolyte imbalance (POA: Yes)    History of concussion (POA: Yes)      FOLLOW UP  No future appointments.  No follow-up provider specified.    MEDICATIONS ON DISCHARGE     Medication List        CONTINUE taking these medications        Instructions   acetaminophen 500 MG Tabs  Commonly known as: Tylenol   Take 500 mg by mouth every 6 hours as needed for Moderate Pain.  Dose: 500 mg     Ajovy 225 MG/1.5ML Sosy  Generic drug: Fremanezumab-vfrm   Inject 225 mg under the skin every 30 (thirty) days.  Dose: 225 mg     guanFACINE 1 MG Tabs  Commonly known as: Tenex   Take 1 mg by mouth every day.  Dose: 1 mg     ibuprofen 200 MG Tabs  Commonly known as: Motrin   Take 600 mg by mouth every 6 hours as needed for Mild Pain.  Dose: 600 mg     MAGNESIUM GLYCINATE PO   Take 1 Tablet by mouth every day.  Dose: 1 Tablet     memantine 10 MG Tabs  Commonly known as: Namenda   Take 10 mg by mouth every day.  Dose: 10 mg     VITAMIN B 12 PO   Take 400 mg by mouth every day.  Dose: 400 mg            STOP taking these medications      sulfamethoxazole-trimethoprim 800-160 MG tablet  Commonly known as: Bactrim DS              Allergies  Allergies   Allergen Reactions    Penicillins Shortness of Breath    Keflex [Cephalexin] Cough       DIET  Orders Placed This  Encounter   Procedures    Diet Order Diet: Regular     Standing Status:   Standing     Number of Occurrences:   1     Order Specific Question:   Diet:     Answer:   Regular [1]       ACTIVITY  As tolerated.  Weight bearing as tolerated    CONSULTATIONS  NONE    PROCEDURES  NONE    IMAGING  EC-ECHOCARDIOGRAM COMPLETE W/O CONT   Final Result      DX-CHEST-PORTABLE (1 VIEW)   Final Result         No acute cardiac or pulmonary abnormality is identified.      MR-BRAIN-WITH & W/O    (Results Pending)         LABORATORY  Lab Results   Component Value Date    SODIUM 139 01/04/2025    POTASSIUM 3.9 01/04/2025    CHLORIDE 104 01/04/2025    CO2 23 01/04/2025    GLUCOSE 95 01/04/2025    BUN 13 01/04/2025    CREATININE 0.92 01/04/2025    GLOMRATE 98 11/13/2022        Lab Results   Component Value Date    WBC 7.6 01/04/2025    HEMOGLOBIN 16.2 01/04/2025    HEMOGLOBIN 16.1 01/04/2025    HEMATOCRIT 45.6 01/04/2025    HEMATOCRIT 47.5 01/04/2025    PLATELETCT 258 01/04/2025    PLATELETCT 262 01/04/2025

## 2025-01-05 NOTE — PROCEDURES
[facility]    Inpatient Standard Video Electroencephalogram Report      Patient Name: Shaka Fajardo  MRN: [id number]  #: [id number]  Date of Service: 01/05/25  Total Recording Time: 0 hours and 24 minutes.  Referring Provider: Thuy Segovia*    INDICATION:  Shaka Fajardo 24 y.o. male. This standard, inpatient, EEG was requested to evaluate for seizure(s).    CURRENT ANTI-SEIZURE AND OTHER PERTINENT MEDICATIONS:     Current Facility-Administered Medications:     albuterol    acetaminophen    ibuprofen    TECHNIQUE: Standard inpatient video EEG was set up by a Neurodiagnostic technologist who performed education to the patient and staff. A minimum of 23 electrodes and 23 channel recording was setup and performed by Neurodiagnostic technologist, in accordance with the international 10-20 system. Impedence, electrode integrity, and technical impressions were documented. The study was reviewed in bipolar and referential montages. The recording examined the patient in the awake, drowsy, and sleep state(s).     DESCRIPTION OF THE RECORD:  EEG background: During maximal wakefulness, the background was continuous, symmetrical, and 11.5 Hz posterior dominant rhythm.  Reactivity and state changes were present.  During drowsiness, a loss of myogenic artifact and theta/delta frequencies were seen.     Occasional N2 sleep transients in the form of rudimentary and/or ill-defined sleep spindles fragments and vertex waves were seen in the leads over the central regions.     ACTIVATION PROCEDURES:   Intermittent Photic stimulation was performed in a stepwise fashion from 1 to 30 Hz and did not elicited any abnormalities on EEG.     ICTAL AND INTERICTAL FINDINGS:   There were rare, poorly formed, right temporal, sharply contoured waveforms noted.             No definite persistent regional slowing was seen during this routine study.      No electroclinical or electrographic seizures were reported or  recorded during the study.     EKG: Sampling of the EKG recording did not demonstrate any abnormalities    EVENTS:  No clinical events recorded or reported    INTERPRETATION:  This was likely an abnormal video EEG recording in the awake, drowsy, and sleep state(s):  The presence of rare, poorly formed, right temporal, sharply contoured waveforms, is of unclear clinical significance, but might point toward increased propensity toward focal seizures arising from that region.   No seizures.     Julian Carson MD  Neurology Attending, Epilepsy Program  [facility]

## 2025-01-05 NOTE — ASSESSMENT & PLAN NOTE
Noted to have magnesium 1.3, potassium 2.9, calcium 6.4 , provided electrolyte replacement prior to transport. Labs unremarkable at presentation  Patient denies change in diet.  - Repeat labs in the morning

## 2025-01-05 NOTE — PROGRESS NOTES
Report received, pt care assumed. Pt resting in bed. Pt mother at bedside. Pt instructed on use of call light. Call light and personal belongings within reach of pt. Seizure precautions in place.

## 2025-01-05 NOTE — ASSESSMENT & PLAN NOTE
Concern concerning for syncope and collapse at the initiation of working out patient has had family members. Patient has had family members who  very early of MI.No stress test, as patient does not have chest pain, and shortness of breath may have been related to onset of exercise  Patient did report possible arrhythmia just prior to passing out. If episode recurs, may consider reaching out to cardiology for Holter monitor  Initially presented with concern for seizure. However, symptoms are consistent with syncope. This was discussed with neurology between ERP and neurology.  - telemetry   - echocardiogram  - orthostatic blood pressures  - TSH, Vit B12  - consider brain MRI

## 2025-01-05 NOTE — CARE PLAN
The patient is Stable - Low risk of patient condition declining or worsening    Shift Goals  Clinical Goals: ECHO in AM, Pt safety, Orthos  Patient Goals: rest, answers  Family Goals: rest, answer    Progress made toward(s) clinical / shift goals:    Problem: Knowledge Deficit - Standard  Goal: Patient and family/care givers will demonstrate understanding of plan of care, disease process/condition, diagnostic tests and medications  Description: Target End Date:  1/5/25    1.  Patient and family/caregiver oriented to unit, equipment, visitation policy and means for communicating concern  2.  Complete/review Learning Assessment  3.  Assess knowledge level of disease process/condition, treatment plan, diagnostic tests and medications  4.  Explain disease process/condition, treatment plan, diagnostic tests and medications  Outcome: Progressing     Problem: Seizure Precautions  Goal: Implementation of seizure precautions  Description: Target End Date:  1/5/25    1.  Padded side rails up at all times  2.  Suction equipment and oxygen delivery system at bedside  3.  Continuous pulse oximeter in use  4.  Implement fall precautions, bed alarm on, bed in lowest position  5.  IV access (per order)  6.  Provide low stimulus environment, avoid exposure to triggers  7.  Instruct patient to use call light/seizure button if having warning signs of impending seizure  Outcome: Progressing

## 2025-01-05 NOTE — PROGRESS NOTES
4 Eyes Skin Assessment Completed by Ciera, RN and MARIAH Washburn.    Head WDL  Ears Redness and Blanching  Nose WDL  Mouth WDL  Neck WDL  Breast/Chest WDL  Shoulder Blades WDL  Spine WDL  (R) Arm/Elbow/Hand WDL  (L) Arm/Elbow/Hand WDL  Abdomen WDL  Groin WDL  Scrotum/Coccyx/Buttocks WDL  (R) Leg WDL  (L) Leg WDL  (R) Heel/Foot/Toe WDL  (L) Heel/Foot/Toe WDL          Devices In Places Pulse Ox      Interventions In Place N/A    Possible Skin Injury No    Pictures Uploaded Into Epic N/A  Wound Consult Placed N/A  RN Wound Prevention Protocol Ordered No

## 2025-01-05 NOTE — HOSPITAL COURSE
Mr. Shaka Fajardo is a very pleasant 24 y.o. male with past medical history of asthma and postconcussive syndrome who transferred from [facility] to [facility] ED presented 1/4/2025 due to a syncopal event.     Patient says he just started a workout program today for the new year. Today was the first day he used exercise bike. He says the workout was intense and after a couple minutes  he suddenly became lightheaded and short of breath. He got off the bike and sat down, feeling as though he was going to pass out. He says his heart rate was quite elevated and he could feel it slowing down and suddenly he woke up laying on the ground. He improved for short time, and they had another episode of syncope. Patient's mother was concerned that patient may have had a seizure. However, this is unclear. The patient presented to [facility] it was noted that his electrolytes were significantly low, including magnesium 1.3, potassium 2.9, calcium 6.4. His electrolytes were replaced prior to transport.  Labs obtained at presentation to [facility], Including CBC, CMP, lipase, lactic acid are unremarkable. The patient denies recent cold or flulike symptoms, denies change in diet, G.I. or  symptoms. He says he felt healthy prior to this event. He feels back to baseline at this time is not had any further events     The patient denies having any episodes like this in the past. He did suffer concussion a couple years ago and has since had issues with focus, memory and, per family report, the patient has not been himself since that time. He is had neurological workup, including workup at [place]; however, he is not had any concussion rehab.The patient does not smoke cigarettes, drink alcohol or use illicit drugs.  Patient admitted to hospital medicine for management of care.    During this hospitalization, an echocardiogram was obtained which noted normal left ventricular systolic function with LVEF approximately  63%, normal RV size and systolic function with patent foramen ovale by agitated saline injection.  A spot EEG was obtained which also noted no seizures, presence of rare, poorly formed, right temporal, sharply contoured waves volumes is of unclear clinical significance but might point towards increase propensity towards focal seizures arising from that specific region.  An MRI of the brain was also obtained which noted MRI brain with and without contrast within normal limits with hypoplastic right maxillary sinus with mucosal inflammatory changes in the right maxillary and ethmoid sinus and right nasofrontal recess with no evidence of mesial temporal sclerosis, cortical dysplasia, or heterotropic gray matter.    Patient seen and examined prior to being discharged.  Discussed results with patient along with mom who was at bedside.  At this time, no acute pathologies noted.  Per neurology, history is consistent with syncope therefore workup was done as patient is back to his baseline with normal neurologic examination per report.  Patient to continue following with [facility] neurology for management of care and continue all home medications.  All questions and concerns answered by doing discharge.  Patient discharged home.

## 2025-01-05 NOTE — PROGRESS NOTES
Report received from MARIAH Vang.  Assumed care of patient.  Agree with previous RN's assessment. Plan of care gone over with the patient and all concerns addressed.  Patient sitting up in bed with no complaints of pain.  Patient A & O  x 4.  No apparent signs of distress.  Safety precautions in place.  Patient educated to call for assistance.  Hourly rounding in place.

## 2025-01-05 NOTE — DISCHARGE INSTRUCTIONS
FOLLOW UP ITEMS POST DISCHARGE  Please call [phone] to schedule PCP appointment for patient.    Required specialty appointments include:       Discharge Instructions per FLAQUITA VasquezPRogelioRDEZ.    Follow-up with PCP s/p hospitalization  Continue following up with [facility] neurology for management of postconcussion syndrome  Continue all home medications    DIET: As tolerated    ACTIVITY: As tolerated    DIAGNOSIS: Syncope    Return to ER if symptoms persist, chest pain, palpitations, shortness of breath, numbness, tingling, weakness, and high fevers.

## 2025-01-05 NOTE — ASSESSMENT & PLAN NOTE
History of concussion with ongoing symptoms of forgetfulness, fogginess. Patient's mother reports patient has not been the same since. Has had workup for this, but no postconcussive therapy.   Patient should follow-up with his neurologist in [place] for further workup, as well as postconcussive therapy. Patient's father reported neurology at [place] suggested follow-up, which she will share happens  - suggest pursuing rehab for this, including possible alternative therapy    [employer] Counseling:  I discussed with the patient the risks of mycophenolate mofetil including but not limited to infection/immunosuppression, GI upset, hypokalemia, hypercholesterolemia, bone marrow suppression, lymphoproliferative disorders, malignancy, GI ulceration/bleed/perforation, colitis, interstitial lung disease, kidney failure, progressive multifocal leukoencephalopathy, and birth defects.  The patient understands that monitoring is required including a baseline creatinine and regular CBC testing. In addition, patient must alert us immediately if symptoms of infection or other concerning signs are noted.

## 2025-01-06 NOTE — CARE PLAN
The patient is Stable - Low risk of patient condition declining or worsening    Shift Goals  Clinical Goals: Echocardiogram  Patient Goals: Complete tests  Family Goals: Updates    Progress made toward(s) clinical / shift goals:    Problem: Knowledge Deficit - Standard  Goal: Patient and family/care givers will demonstrate understanding of plan of care, disease process/condition, diagnostic tests and medications  Outcome: Progressing     Problem: Seizure Precautions  Goal: Implementation of seizure precautions  Outcome: Progressing       Patient is not progressing towards the following goals:

## 2025-01-06 NOTE — PROGRESS NOTES
Reviewed discharge instructions with pt and mother at bedside, pt verbalized understanding. Pt discharged in stable condition, VSS. Pt to DC home with mom. Pt transported to car via wheelchair with CCT Rigoberto. All belongings sent with pt

## 2025-01-06 NOTE — PROGRESS NOTES
Report received. Assumed care. Pt AAOx4. VSS. Denies pain at this time, No other complaints reported; Pt was updated on POC pending MRI and discharge to home once MRI is complete, pt will be notified with results, pt and family agree to POC. White board updated, All question answered. Pt has call light within reach, bed is in the lowest position. Pt has no other needs at this time.